# Patient Record
Sex: FEMALE | Race: WHITE | URBAN - METROPOLITAN AREA
[De-identification: names, ages, dates, MRNs, and addresses within clinical notes are randomized per-mention and may not be internally consistent; named-entity substitution may affect disease eponyms.]

---

## 2018-04-17 ENCOUNTER — HOSPITAL ENCOUNTER (OUTPATIENT)
Age: 38
Discharge: HOME | End: 2018-04-19
Payer: MEDICARE

## 2018-04-17 LAB
BILIRUBIN URINE: NEGATIVE
BLOOD, URINE: NEGATIVE
CLARITY: CLEAR
COLOR: YELLOW
GLUCOSE URINE: NEGATIVE MG/DL
KETONES, URINE: NEGATIVE MG/DL
LEUKOCYTE ESTERASE, URINE: NEGATIVE
NITRITE, URINE: NEGATIVE
PH UA: 6.5 (ref 5–9)
PROTEIN UA: NEGATIVE MG/DL
SPECIFIC GRAVITY UA: 1.02 (ref 1–1.03)
UROBILINOGEN, URINE: 0.2 E.U./DL

## 2018-04-17 PROCEDURE — 87088 URINE BACTERIA CULTURE: CPT

## 2018-04-17 PROCEDURE — 81003 URINALYSIS AUTO W/O SCOPE: CPT

## 2018-04-20 LAB — URINE CULTURE, ROUTINE: NORMAL

## 2020-01-16 ENCOUNTER — HOSPITAL ENCOUNTER (OUTPATIENT)
Age: 40
Discharge: HOME | End: 2020-01-18
Payer: MEDICARE

## 2020-01-16 PROCEDURE — 87491 CHLMYD TRACH DNA AMP PROBE: CPT

## 2020-01-16 PROCEDURE — 87591 N.GONORRHOEAE DNA AMP PROB: CPT

## 2020-01-21 LAB
C TRACH DNA GENITAL QL NAA+PROBE: NEGATIVE
N. GONORRHOEAE DNA: NEGATIVE
SOURCE: NORMAL

## 2020-06-25 ENCOUNTER — HOSPITAL ENCOUNTER (OUTPATIENT)
Age: 40
Discharge: HOME | End: 2020-06-27
Payer: MEDICARE

## 2020-06-25 PROCEDURE — G0123 SCREEN CERV/VAG THIN LAYER: HCPCS

## 2022-06-06 ENCOUNTER — HOSPITAL ENCOUNTER (EMERGENCY)
Age: 42
Discharge: HOME | End: 2022-06-06
Payer: MEDICARE

## 2022-06-06 VITALS
WEIGHT: 209 LBS | HEIGHT: 64 IN | BODY MASS INDEX: 35.68 KG/M2 | HEART RATE: 91 BPM | TEMPERATURE: 97.3 F | DIASTOLIC BLOOD PRESSURE: 76 MMHG | RESPIRATION RATE: 16 BRPM | OXYGEN SATURATION: 98 % | SYSTOLIC BLOOD PRESSURE: 127 MMHG

## 2022-06-06 DIAGNOSIS — F12.90 MARIJUANA USE: Primary | ICD-10-CM

## 2022-06-06 LAB
ALBUMIN SERPL-MCNC: 4.2 G/DL (ref 3.5–5.2)
ALP BLD-CCNC: 50 U/L (ref 35–104)
ALT SERPL-CCNC: 18 U/L (ref 0–32)
AMPHETAMINE SCREEN, URINE: NOT DETECTED
ANION GAP SERPL CALCULATED.3IONS-SCNC: 9 MMOL/L (ref 7–16)
AST SERPL-CCNC: 16 U/L (ref 0–31)
BACTERIA: NORMAL /HPF
BARBITURATE SCREEN URINE: NOT DETECTED
BASOPHILS ABSOLUTE: 0.05 E9/L (ref 0–0.2)
BASOPHILS RELATIVE PERCENT: 0.6 % (ref 0–2)
BENZODIAZEPINE SCREEN, URINE: NOT DETECTED
BILIRUB SERPL-MCNC: 0.2 MG/DL (ref 0–1.2)
BILIRUBIN URINE: NEGATIVE
BLOOD, URINE: NEGATIVE
BUN BLDV-MCNC: 10 MG/DL (ref 6–20)
CALCIUM SERPL-MCNC: 9.5 MG/DL (ref 8.6–10.2)
CANNABINOID SCREEN URINE: POSITIVE
CHLORIDE BLD-SCNC: 106 MMOL/L (ref 98–107)
CLARITY: CLEAR
CO2: 24 MMOL/L (ref 22–29)
COCAINE METABOLITE SCREEN URINE: NOT DETECTED
COLOR: YELLOW
CREAT SERPL-MCNC: 0.7 MG/DL (ref 0.5–1)
EOSINOPHILS ABSOLUTE: 0.16 E9/L (ref 0.05–0.5)
EOSINOPHILS RELATIVE PERCENT: 1.8 % (ref 0–6)
EPITHELIAL CELLS, UA: NORMAL /HPF
FENTANYL SCREEN, URINE: NOT DETECTED
GFR AFRICAN AMERICAN: >60
GFR NON-AFRICAN AMERICAN: >60 ML/MIN/1.73
GLUCOSE BLD-MCNC: 94 MG/DL (ref 74–99)
GLUCOSE URINE: NEGATIVE MG/DL
HCG(URINE) PREGNANCY TEST: NEGATIVE
HCT VFR BLD CALC: 35.9 % (ref 34–48)
HEMOGLOBIN: 12.8 G/DL (ref 11.5–15.5)
IMMATURE GRANULOCYTES #: 0.04 E9/L
IMMATURE GRANULOCYTES %: 0.5 % (ref 0–5)
KETONES, URINE: NEGATIVE MG/DL
LEUKOCYTE ESTERASE, URINE: NEGATIVE
LYMPHOCYTES ABSOLUTE: 2.51 E9/L (ref 1.5–4)
LYMPHOCYTES RELATIVE PERCENT: 28.4 % (ref 20–42)
Lab: ABNORMAL
MCH RBC QN AUTO: 29.6 PG (ref 26–35)
MCHC RBC AUTO-ENTMCNC: 35.7 % (ref 32–34.5)
MCV RBC AUTO: 82.9 FL (ref 80–99.9)
METHADONE SCREEN, URINE: NOT DETECTED
MONOCYTES ABSOLUTE: 0.91 E9/L (ref 0.1–0.95)
MONOCYTES RELATIVE PERCENT: 10.3 % (ref 2–12)
NEUTROPHILS ABSOLUTE: 5.16 E9/L (ref 1.8–7.3)
NEUTROPHILS RELATIVE PERCENT: 58.4 % (ref 43–80)
NITRITE, URINE: NEGATIVE
OPIATE SCREEN URINE: NOT DETECTED
OXYCODONE URINE: NOT DETECTED
PDW BLD-RTO: 12.7 FL (ref 11.5–15)
PH UA: 6 (ref 5–9)
PHENCYCLIDINE SCREEN URINE: NOT DETECTED
PLATELET # BLD: 361 E9/L (ref 130–450)
PMV BLD AUTO: 9.3 FL (ref 7–12)
POTASSIUM REFLEX MAGNESIUM: 3.9 MMOL/L (ref 3.5–5)
PROTEIN UA: NEGATIVE MG/DL
RBC # BLD: 4.33 E12/L (ref 3.5–5.5)
RBC UA: NORMAL /HPF (ref 0–2)
SODIUM BLD-SCNC: 139 MMOL/L (ref 132–146)
SPECIFIC GRAVITY UA: 1.01 (ref 1–1.03)
TOTAL PROTEIN: 7.2 G/DL (ref 6.4–8.3)
UROBILINOGEN, URINE: 0.2 E.U./DL
WBC # BLD: 8.8 E9/L (ref 4.5–11.5)
WBC UA: NORMAL /HPF (ref 0–5)

## 2022-06-06 PROCEDURE — 82077 ASSAY SPEC XCP UR&BREATH IA: CPT

## 2022-06-06 PROCEDURE — 85025 COMPLETE CBC W/AUTO DIFF WBC: CPT

## 2022-06-06 PROCEDURE — 87088 URINE BACTERIA CULTURE: CPT

## 2022-06-06 PROCEDURE — 99283 EMERGENCY DEPT VISIT LOW MDM: CPT

## 2022-06-06 PROCEDURE — 80143 DRUG ASSAY ACETAMINOPHEN: CPT

## 2022-06-06 PROCEDURE — 36415 COLL VENOUS BLD VENIPUNCTURE: CPT

## 2022-06-06 PROCEDURE — 80307 DRUG TEST PRSMV CHEM ANLYZR: CPT

## 2022-06-06 PROCEDURE — 81001 URINALYSIS AUTO W/SCOPE: CPT

## 2022-06-06 PROCEDURE — 80053 COMPREHEN METABOLIC PANEL: CPT

## 2022-06-06 PROCEDURE — 81025 URINE PREGNANCY TEST: CPT

## 2022-06-06 PROCEDURE — 80179 DRUG ASSAY SALICYLATE: CPT

## 2022-06-06 RX ORDER — ACETAMINOPHEN 500 MG
1000 TABLET ORAL ONCE
Status: DISCONTINUED | OUTPATIENT
Start: 2022-06-06 | End: 2022-06-06 | Stop reason: HOSPADM

## 2022-06-06 ASSESSMENT — PAIN SCALES - GENERAL: PAINLEVEL_OUTOF10: 0

## 2022-06-06 ASSESSMENT — PAIN - FUNCTIONAL ASSESSMENT: PAIN_FUNCTIONAL_ASSESSMENT: NONE - DENIES PAIN

## 2022-06-06 NOTE — Clinical Note
Obed Westfall was seen and treated in our emergency department on 6/6/2022. She may return to work on 06/08/2022. If you have any questions or concerns, please don't hesitate to call.       THERESE Martinez - CNP

## 2022-06-07 LAB
ACETAMINOPHEN LEVEL: <5 MCG/ML (ref 10–30)
ETHANOL: <10 MG/DL (ref 0–0.08)
SALICYLATE, SERUM: <0.3 MG/DL (ref 0–30)
TRICYCLIC ANTIDEPRESSANTS SCREEN SERUM: NEGATIVE NG/ML
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
## 2022-06-09 LAB — URINE CULTURE, ROUTINE: NORMAL

## 2023-03-12 ENCOUNTER — APPOINTMENT (OUTPATIENT)
Dept: GENERAL RADIOLOGY | Age: 43
End: 2023-03-12
Payer: MEDICARE

## 2023-03-12 ENCOUNTER — HOSPITAL ENCOUNTER (EMERGENCY)
Age: 43
Discharge: HOME | End: 2023-03-12
Attending: EMERGENCY MEDICINE
Payer: MEDICARE

## 2023-03-12 VITALS
RESPIRATION RATE: 17 BRPM | BODY MASS INDEX: 34.15 KG/M2 | HEART RATE: 112 BPM | SYSTOLIC BLOOD PRESSURE: 138 MMHG | WEIGHT: 200 LBS | OXYGEN SATURATION: 95 % | DIASTOLIC BLOOD PRESSURE: 74 MMHG | HEIGHT: 64 IN | TEMPERATURE: 98.3 F

## 2023-03-12 DIAGNOSIS — T40.601A OPIATE OVERDOSE, ACCIDENTAL OR UNINTENTIONAL, INITIAL ENCOUNTER (HCC): Primary | ICD-10-CM

## 2023-03-12 LAB
ACETAMINOPHEN LEVEL: <5 MCG/ML (ref 10–30)
AMPHETAMINE SCREEN, URINE: NOT DETECTED
ANION GAP SERPL CALCULATED.3IONS-SCNC: 9 MMOL/L (ref 7–16)
BARBITURATE SCREEN URINE: NOT DETECTED
BASOPHILS ABSOLUTE: 0.05 E9/L (ref 0–0.2)
BASOPHILS RELATIVE PERCENT: 0.2 % (ref 0–2)
BENZODIAZEPINE SCREEN, URINE: NOT DETECTED
BUN BLDV-MCNC: 14 MG/DL (ref 6–20)
CALCIUM SERPL-MCNC: 8.5 MG/DL (ref 8.6–10.2)
CANNABINOID SCREEN URINE: NOT DETECTED
CHLORIDE BLD-SCNC: 104 MMOL/L (ref 98–107)
CO2: 24 MMOL/L (ref 22–29)
COCAINE METABOLITE SCREEN URINE: NOT DETECTED
CREAT SERPL-MCNC: 0.7 MG/DL (ref 0.5–1)
EOSINOPHILS ABSOLUTE: 0.05 E9/L (ref 0.05–0.5)
EOSINOPHILS RELATIVE PERCENT: 0.2 % (ref 0–6)
ETHANOL: <10 MG/DL (ref 0–0.08)
FENTANYL SCREEN, URINE: POSITIVE
GFR SERPL CREATININE-BSD FRML MDRD: >60 ML/MIN/1.73
GLUCOSE BLD-MCNC: 139 MG/DL (ref 74–99)
GLUCOSE BLD-MCNC: 210 MG/DL
HCG, URINE, POC: NEGATIVE
HCT VFR BLD CALC: 35.2 % (ref 34–48)
HEMOGLOBIN: 12.3 G/DL (ref 11.5–15.5)
IMMATURE GRANULOCYTES #: 0.13 E9/L
IMMATURE GRANULOCYTES %: 0.6 % (ref 0–5)
LYMPHOCYTES ABSOLUTE: 2 E9/L (ref 1.5–4)
LYMPHOCYTES RELATIVE PERCENT: 9.2 % (ref 20–42)
Lab: ABNORMAL
Lab: NORMAL
MCH RBC QN AUTO: 29.5 PG (ref 26–35)
MCHC RBC AUTO-ENTMCNC: 34.9 % (ref 32–34.5)
MCV RBC AUTO: 84.4 FL (ref 80–99.9)
METER GLUCOSE: 210 MG/DL (ref 74–99)
METHADONE SCREEN, URINE: NOT DETECTED
MONOCYTES ABSOLUTE: 1.15 E9/L (ref 0.1–0.95)
MONOCYTES RELATIVE PERCENT: 5.3 % (ref 2–12)
NEGATIVE QC PASS/FAIL: NORMAL
NEUTROPHILS ABSOLUTE: 18.4 E9/L (ref 1.8–7.3)
NEUTROPHILS RELATIVE PERCENT: 84.5 % (ref 43–80)
OPIATE SCREEN URINE: NOT DETECTED
OXYCODONE URINE: NOT DETECTED
PDW BLD-RTO: 12.8 FL (ref 11.5–15)
PHENCYCLIDINE SCREEN URINE: NOT DETECTED
PLATELET # BLD: 395 E9/L (ref 130–450)
PMV BLD AUTO: 9.5 FL (ref 7–12)
POSITIVE QC PASS/FAIL: NORMAL
POTASSIUM SERPL-SCNC: 3.4 MMOL/L (ref 3.5–5)
PRO-BNP: 50 PG/ML (ref 0–125)
RBC # BLD: 4.17 E12/L (ref 3.5–5.5)
SALICYLATE, SERUM: <0.3 MG/DL (ref 0–30)
SODIUM BLD-SCNC: 137 MMOL/L (ref 132–146)
TRICYCLIC ANTIDEPRESSANTS SCREEN SERUM: NEGATIVE NG/ML
TROPONIN, HIGH SENSITIVITY: 29 NG/L (ref 0–9)
WBC # BLD: 21.8 E9/L (ref 4.5–11.5)

## 2023-03-12 PROCEDURE — 82962 GLUCOSE BLOOD TEST: CPT

## 2023-03-12 PROCEDURE — 82077 ASSAY SPEC XCP UR&BREATH IA: CPT

## 2023-03-12 PROCEDURE — 36415 COLL VENOUS BLD VENIPUNCTURE: CPT

## 2023-03-12 PROCEDURE — 83880 ASSAY OF NATRIURETIC PEPTIDE: CPT

## 2023-03-12 PROCEDURE — 2580000003 HC RX 258: Performed by: EMERGENCY MEDICINE

## 2023-03-12 PROCEDURE — 71045 X-RAY EXAM CHEST 1 VIEW: CPT

## 2023-03-12 PROCEDURE — 80179 DRUG ASSAY SALICYLATE: CPT

## 2023-03-12 PROCEDURE — 93005 ELECTROCARDIOGRAM TRACING: CPT | Performed by: EMERGENCY MEDICINE

## 2023-03-12 PROCEDURE — 80143 DRUG ASSAY ACETAMINOPHEN: CPT

## 2023-03-12 PROCEDURE — 85025 COMPLETE CBC W/AUTO DIFF WBC: CPT

## 2023-03-12 PROCEDURE — 6370000000 HC RX 637 (ALT 250 FOR IP): Performed by: EMERGENCY MEDICINE

## 2023-03-12 PROCEDURE — 80048 BASIC METABOLIC PNL TOTAL CA: CPT

## 2023-03-12 PROCEDURE — 99285 EMERGENCY DEPT VISIT HI MDM: CPT

## 2023-03-12 PROCEDURE — 84484 ASSAY OF TROPONIN QUANT: CPT

## 2023-03-12 PROCEDURE — 80307 DRUG TEST PRSMV CHEM ANLYZR: CPT

## 2023-03-12 RX ORDER — POTASSIUM CHLORIDE 20 MEQ/1
40 TABLET, EXTENDED RELEASE ORAL ONCE
Status: COMPLETED | OUTPATIENT
Start: 2023-03-12 | End: 2023-03-12

## 2023-03-12 RX ORDER — ACETAMINOPHEN 500 MG
1000 TABLET ORAL ONCE
Status: COMPLETED | OUTPATIENT
Start: 2023-03-12 | End: 2023-03-12

## 2023-03-12 RX ORDER — 0.9 % SODIUM CHLORIDE 0.9 %
1000 INTRAVENOUS SOLUTION INTRAVENOUS ONCE
Status: COMPLETED | OUTPATIENT
Start: 2023-03-12 | End: 2023-03-12

## 2023-03-12 RX ORDER — ONDANSETRON 4 MG/1
4 TABLET, ORALLY DISINTEGRATING ORAL ONCE
Status: COMPLETED | OUTPATIENT
Start: 2023-03-12 | End: 2023-03-12

## 2023-03-12 RX ADMIN — ACETAMINOPHEN 1000 MG: 500 TABLET ORAL at 04:08

## 2023-03-12 RX ADMIN — POTASSIUM CHLORIDE 40 MEQ: 1500 TABLET, EXTENDED RELEASE ORAL at 08:00

## 2023-03-12 RX ADMIN — ONDANSETRON 4 MG: 4 TABLET, ORALLY DISINTEGRATING ORAL at 04:09

## 2023-03-12 RX ADMIN — SODIUM CHLORIDE 1000 ML: 9 INJECTION, SOLUTION INTRAVENOUS at 05:04

## 2023-03-12 ASSESSMENT — LIFESTYLE VARIABLES
HOW MANY STANDARD DRINKS CONTAINING ALCOHOL DO YOU HAVE ON A TYPICAL DAY: 1 OR 2
HOW OFTEN DO YOU HAVE A DRINK CONTAINING ALCOHOL: 2-4 TIMES A MONTH

## 2023-03-12 NOTE — DISCHARGE INSTRUCTIONS
[facility]  2015  For Residents of [facility], [facility] and [facility]  Call [address] [phone]    * Alcoholics Anonymous [phone]                *Narcotics Anonymous [phone]    [address] [facility] [place] [phone] An [facility] 27, [place], [address]  [email or web address]  *Residents of:  [facility] Only for [employer]. ALL other [employer] on [facility] Accepted. *Payments Accepted: Medicaid or Self-pay ONLY for [employer]. Private Insurance accepted for Outpatient Programs   *Services Offered: Outpatient Behavioral Health & Chemical Dependency. [facility] [phone]  [facility], [address] [place] [address] 210   *Payments Accepted: Medicaid, self-pay  [email or web address]      [address] [phone]  [place] [address]  www.[facility] org/   *Residents of:  [facility] Only   *Payments Accepted: Medicaid or [address] Offered: Outpatient Behavioral Health & Chemical Dependency. [facility] [phone]  or [phone], Maritza Ferrera. [address]  www. [email or web address]/   *Residents of:  [facility]   *Payments Accepted: Self-Pay and [employer]   *Services Offered: Detox & Outpatient Substance Abuse Programs. [address] [place] [address] [phone]) [phone]. [phone]  [address], [place], [address] [place]  www.[email or web address]/  (MUST CALL DAILY FOR BED AVAILABILITY)   *Residents of:  [address] Residents   *Payments Accepted: Medicaid or Self-Pay   *Services Offered: Detox & Outpatient Substance Abuse Programs. [facility] [phone] 61 1025 [address]  www.[employer]. [employer]/  *Residents of:  [address] Residents   *Payments Accepted:  Self-Pay, Private Insurance, Some Medicaid for age <20 Years. *Services Offered: [facility] [place] [facility] [place] [phone] or [phone]  www. [facility].org/     *Residents of:  [facility]   *Payments Accepted: [employer], [place] or Self-Pay. *Services Offered: Following Detox, Outpatient Substance Abuse and [place]  [address] [facility],[address]    [facility], [address] [facility]   [place], [address]    [address]   [address]    [address] [place], [address]   [place], [address]      [address] [facility] [address] [phone] 1200 [facility] [address], [place], [address] [place] [address]  www. "[employer]"/  *Residents of:  All [facility]   *Payments Accepted: [address] [facility]. *Services Offered: Intensive Outpatient Substance Abuse and [facility] [phone]  [facility], [address]  www. [facility]. org/  *Residents of:  [facility]. *Payments Accepted: Private Insurance, [place] and Self-Pay/Sl[employer] Scale. *Services Offered: Substance Abuse Outpatient Programs. [address] [facility],[address] [phone]  6172 [facility], [address]  www.[facility]. org/  *Residents of:  [facility]. *Payments Accepted: Private Insurance, [place] and Self-Pay. *Services Offered: Substance Abuse Outpatient Programs. [facility] [phone]  [address] [place], [place] [address] [facility], [address]  www.[email or web address]  *Residents of:  All [facility]. *Payments Accepted: Private Insurance, [place] and Self-pay. *Services Offered: Substance Abuse Outpatient Programs.       [employer] [phone]  www. [email or web address]/  [facility] Office:    Adult Office:    Children's Office:  [phone] [address]. [place], [address]    Ziyad Johnson, [address]               Ziyad Elizabeth, [address]  *Residents of:  [facility] ONLY for Self-Pay. All Counties accepted with [employer], Medicare & Medicaid   *Payments Accepted: Medicare, Medicaid, Private Insurance and Self-Pay  *Services Offered: [address]. for Counseling. [facility] [phone] 4775 [facility], [address] [place] [address]  www. [facility]. org/  *Payments Accepted: Sliding Scale for Fees. *Services Offered: Call for Available Services. [employer] [phone] [facility] 64, [place], [address]  www. [email or web address]/  (MUST CALL DAILY FOR BED AVAILABILITY)  *Residents of:  [facility]. *Payments Accepted: Medicaid and Self-Pay. *Services Offered: Detox & Substance Abuse Outpatient Programs.

## 2023-03-12 NOTE — LETTER
5 [facility] Emergency Department  [address] [place] [address]  Phone: [phone]               March 12, 2023    Patient: Dee Villatoro   YOB: 1980   Date of Visit: 3/12/2023       To Whom It May Concern:    Yanique Leo was seen and treated in our emergency department on 3/12/2023. She may return to work on 03/14/2023.       Sincerely,               Signature:__________________________________

## 2023-03-12 NOTE — ED PROVIDER NOTES
[facility] ENCOUNTER        Pt Name: Wesley Brown  MRN: [id number]  [place] 1980  Date of evaluation: 3/12/2023  Provider: Blanco Cummings DO  PCP: Mark Reinoso MD  Note Started: 4:01 AM EDT 3/12/23    CHIEF COMPLAINT       Chief Complaint   Patient presents with    Drug Overdose     Pt states they did a line of jackeline, per ems PD was called and gave 0.2mg of narcan intranasally and ems gave 0.2mg more intranasally. HISTORY OF PRESENT ILLNESS: 1 or more Elements   History From: Patient    Limitations to history : None    Wesley Brown is a 38 y.o. female who presents to the emergency department for opiate overdose. The patient states that she was out having fun with her friends when she snorted a line of oxycodone. She states the next thing she knew she woke up with EMS around her. Patient was unresponsive. Patient received Narcan with resolution of her unresponsiveness. Patient does have some nausea and vomiting. Also complains of a headache. No chest pain or shortness of breath. No abdominal pain. No numbness tingling. No focal deficits. Nursing Notes were all reviewed and agreed with or any disagreements were addressed in the HPI. REVIEW OF EXTERNAL NOTE :       EMS report taken. Patient found unresponsive. Patient received 4 mg of Narcan total.  Patient did have some nausea vomiting after Narcan. REVIEW OF SYSTEMS :           Positives and Pertinent negatives as per HPI.      SURGICAL HISTORY     Past Surgical History:   Procedure Laterality Date    ABDOMINOPLASTY      CERVIX REMOVAL      d/t cancer       CURRENTMEDICATIONS       Discharge Medication List as of 3/12/2023  8:11 AM        CONTINUE these medications which have NOT CHANGED    Details   lovastatin (MEVACOR) 40 MG tablet TK 1 T PO QHS, R-3      lamoTRIgine (LAMICTAL) 100 MG tablet TK 1 T PO QD, R-1      Omega-3-acid Ethyl Esters & D3 1 & 1000 GM & UNIT KIT Take 1 g by mouth 2 times daily      CPAP Machine MISC Disp-1 each, R-0, PrintPlease provide patient with a CPAP at 6 cm water pressure with a ResMed AirFit N10 mask size small with heated humidification and C-Flex of 3. Also provide new masks, tubing, filters, head gear, and water chambers as needed. Diagn osis-AYUSH Faxed to [facility] Length of need 99 months      busPIRone (BUSPAR) 10 MG tablet Take 2 tablets by mouth 3 times daily, Disp-42 tablet, R-0      buPROPion (WELLBUTRIN SR) 200 MG extended release tablet Take 1 tablet by mouth every morning, Disp-14 tablet, R-0      prazosin (MINIPRESS) 2 MG capsule Take 1 capsule by mouth nightly, Disp-14 capsule, R-0      melatonin 3 MG TABS tablet Take 1 tablet by mouth nightly, Disp-14 tablet, R-0             ALLERGIES     Patient has no known allergies.     FAMILYHISTORY       Family History   Problem Relation Age of Onset    Cancer Mother         SOCIAL HISTORY       Social History     Tobacco Use    Smoking status: Never    Smokeless tobacco: Never   Substance Use Topics    Alcohol use: No     Alcohol/week: 0.0 standard drinks     Comment: social    Drug use: No       SCREENINGS        [place] Coma Scale  Eye Opening: Spontaneous  Best Verbal Response: Oriented  Best Motor Response: Obeys commands  [place]gy Coma Scale Score: 15                CIWA Assessment  BP: 138/74  Heart Rate: (!) 112           PHYSICAL EXAM  1 or more Elements     ED Triage Vitals   BP Temp Temp Source Heart Rate Resp SpO2 Height Weight   03/12/23 0356 03/12/23 0359 03/12/23 0359 03/12/23 0356 -- 03/12/23 0356 -- 03/12/23 0356   (!) 140/78 98.3 °F (36.8 °C) Oral (!) 108  95 %  200 lb (90.7 kg)                Constitutional/General: Alert and oriented x3, vomit noted on patient's chart  Head: Normocephalic and atraumatic  Eyes: PERRL, EOMI, conjunctiva normal, sclera non icteric  ENT:  Oropharynx clear, handling secretions, no trismus, no asymmetry of the posterior oropharynx or uvular edema  Neck: Supple, full ROM, no stridor, no meningeal signs  Respiratory: Lungs clear to auscultation bilaterally, no wheezes, rales, or rhonchi. Not in respiratory distress  Cardiovascular: Tachycardic regular rhythm. No murmurs, no gallops, no rubs. 2+ distal pulses. Equal extremity pulses. Chest: No chest wall tenderness  GI:  Abdomen Soft, Non tender, Non distended. No rebound, guarding, or rigidity. No pulsatile masses. Musculoskeletal: Moves all extremities x 4. Warm and well perfused, no clubbing, no cyanosis, no edema. Capillary refill <3 seconds  Integument: skin warm and dry. No rashes.    Neurologic: GCS 15, no focal deficits, symmetric strength 5/5 in the upper and lower extremities bilaterally  Psychiatric: Normal Affect            DIAGNOSTIC RESULTS   LABS:    Labs Reviewed   CBC WITH AUTO DIFFERENTIAL - Abnormal; Notable for the following components:       Result Value    WBC 21.8 (*)     MCHC 34.9 (*)     Neutrophils % 84.5 (*)     Lymphocytes % 9.2 (*)     Neutrophils Absolute 18.40 (*)     Monocytes Absolute 1.15 (*)     All other components within normal limits   BASIC METABOLIC PANEL - Abnormal; Notable for the following components:    Potassium 3.4 (*)     Glucose 139 (*)     Calcium 8.5 (*)     All other components within normal limits   TROPONIN - Abnormal; Notable for the following components:    Troponin, High Sensitivity 29 (*)     All other components within normal limits   SERUM DRUG SCREEN - Abnormal; Notable for the following components:    Acetaminophen Level <5.0 (*)     All other components within normal limits   URINE DRUG SCREEN - Abnormal; Notable for the following components:    FENTANYL SCREEN, URINE POSITIVE (*)     All other components within normal limits   POCT GLUCOSE - Abnormal; Notable for the following components:    Meter Glucose 210 (*)     All other components within normal limits   POCT GLUCOSE - Normal   BRAIN NATRIURETIC PEPTIDE   POC PREGNANCY UR-QUAL       As interpreted by me, the above displayed labs are abnormal. All other labs obtained during this visit were within normal range or not returned as of this dictation. EKG Interpretation  Interpreted by emergency department physician, Radhika Fowler DO    EKG shows sinus tachycardia 104 bpm.  Normal axis. Normal QRS. No STEMI. RADIOLOGY:   Non-plain film images such as CT, Ultrasound and MRI are read by the radiologist. Plain radiographic images are visualized and preliminarily interpreted by the ED Provider with the below findings:    X-ray shows no acute process    Interpretation per the Radiologist below, if available at the time of this note:    XR CHEST PORTABLE   Final Result   No acute disease. RECOMMENDATION:   Careful clinical correlation and follow up recommended. No results found. No results found. PROCEDURES   Unless otherwise noted below, none          CRITICAL CARE TIME (.cct)       PAST MEDICAL HISTORY/Chronic Conditions Affecting Care      has a past medical history of Abnormal Pap smear, Anxiety, Bipolar disorder, unspecified ([facility] [address] (1/12/2015), Cancer ([facility] [address] Constipation, Depression, Obesity complicating pregnancy, childbirth, or puerperium, antepartum (1/13/2015), Postpartum depression, and PTSD (post-traumatic stress disorder).      EMERGENCY DEPARTMENT COURSE    Vitals:    Vitals:    03/12/23 0700 03/12/23 0703 03/12/23 0803 03/12/23 0826   BP:  122/75 129/64 138/74   Pulse: (!) 114  (!) 112 (!) 112   Resp: 16  19 17   Temp:    98.3 °F (36.8 °C)   TempSrc:       SpO2: 97%  96% 95%   Weight:       Height:           Patient was given the following medications:  Medications   ondansetron (ZOFRAN-ODT) disintegrating tablet 4 mg (4 mg Oral Given 3/12/23 0409)   acetaminophen (TYLENOL) tablet 1,000 mg (1,000 mg Oral Given 3/12/23 0408)   0.9 % sodium chloride bolus (0 mLs IntraVENous Stopped 3/12/23 0707)   potassium chloride (KLOR-CON M) extended release tablet 40 mEq (40 mEq Oral Given 3/12/23 0800)           Is this patient to be included in the SEP-1 Core Measure due to severe sepsis or septic shock? No Exclusion criteria - the patient is NOT to be included for SEP-1 Core Measure due to: Infection is not suspected        Medical Decision Making/Differential Diagnosis:    CC/HPI Summary, Social Determinants of health, Records Reviewed, DDx, testing done/not done, ED Course, Reassessment, disposition considerations/shared decision making with patient, consults, disposition:      ED Course as of 03/12/23 2009   Sun Mar 12, 2023   0611 6:11 AM EDT  I received this patient at sign out from Dr. Dahiana Oscar. I have discussed the patient's initial exam, treatment and plan of care with the out going physician. I have introduced my self to the patient / family and have answered their questions to this point. I have examined the patient myself and reviewed ordered tests / medications and  reviewed any available results to this point. If a resident is involved in the Emergency Department care, I have discussed my findings and plan with them as well. Awaiting labs. [TG]      ED Course User Index  [TG] Daylin Fulton, DO        Medical Decision Making  Amount and/or Complexity of Data Reviewed  Labs: ordered. Radiology: ordered. ECG/medicine tests: ordered. Risk  OTC drugs. Prescription drug management. This is a 82-year-old female presented to the ED after an opiate overdose. Upon arrival to the ED patient's vital signs are within normal limits except for blood pressure 140/78 and a heart rate of 108. On examination patient appears nontoxic. Mildly tachycardic. Differential diagnoses includes but is not limited to opiate overdose, polysubstance abuse, hypoglycemia. Patient will undergo POC glucose. Patient given oral Tylenol and oral Zofran for symptom control.   Patient undergo observation for 2 hours due to opiate overdose and Narcan use.  While patient was undergoing observation patient did have an episode of hypoxia down to 88%. Patient placed on nasal cannula. Due to the patient's episode of hypoxia Labs and imaging and EKG ordered. EKG showed sinus tachycardia no ischemic changes. Chest x-ray with was in normal limits with no acute process. Patient's CBC did show leukocytosis 21.8. At this time patient was signed out to Dr. Janet Gutierrez pending BMP troponin serum drug screen urine drug screen reevaluation and disposition. Please see his note for further details. CONSULTS: (Who and What was discussed)  None        I am the Primary Clinician of Record. FINAL IMPRESSION      1.  Opiate overdose, accidental or unintentional, initial encounter [facility])          DISPOSITION/PLAN     DISPOSITION Decision To Discharge 03/12/2023 08:10:23 AM      PATIENT REFERRED TO:  Nikolas Madden MD  [address] [place] [address] [phone]    Call in 1 day      Nikolas Madden MD  [address] [facility] [phone]    Call in 1 day      DISCHARGE MEDICATIONS:  Discharge Medication List as of 3/12/2023  8:11 AM          DISCONTINUED MEDICATIONS:  Discharge Medication List as of 3/12/2023  8:11 AM                 (Please note that portions of this note were completed with a voice recognition program.  Efforts were made to edit the dictations but occasionally words are mis-transcribed.)    Kym Roman DO (electronically signed)            Kym Roman DO  03/12/23 2010

## 2023-03-12 NOTE — ED PROVIDER NOTES
ED Course as of 03/12/23 0810   Sun Mar 12, 2023   0611 6:11 AM EDT  I received this patient at sign out from Dr. Tee Lacy. I have discussed the patient's initial exam, treatment and plan of care with the out going physician. I have introduced my self to the patient / family and have answered their questions to this point. I have examined the patient myself and reviewed ordered tests / medications and  reviewed any available results to this point. If a resident is involved in the Emergency Department care, I have discussed my findings and plan with them as well. Awaiting labs. [TG]      ED Course User Index  [TG] Cumberlandzo Beasley, DO       --------------------------------------------- PAST HISTORY ---------------------------------------------  Past Medical History:  has a past medical history of Abnormal Pap smear, Anxiety, Bipolar disorder, unspecified ([facility] [address] Cancer ([facility] [address] Constipation, Depression, Obesity complicating pregnancy, childbirth, or puerperium, antepartum, Postpartum depression, and PTSD (post-traumatic stress disorder). Past Surgical History:  has a past surgical history that includes Abdominoplasty and Cervix removal.    Social History:  reports that she has never smoked. She has never used smokeless tobacco. She reports that she does not drink alcohol and does not use drugs. Family History: family history includes Cancer in her mother. The patients home medications have been reviewed. Allergies: Patient has no known allergies.     -------------------------------------------------- RESULTS -------------------------------------------------  Labs:  Results for orders placed or performed during the hospital encounter of 03/12/23   CBC with Auto Differential   Result Value Ref Range    WBC 21.8 (H) 4.5 - 11.5 E9/L    RBC 4.17 3.50 - 5.50 E12/L    Hemoglobin 12.3 11.5 - 15.5 g/dL    Hematocrit 35.2 34.0 - 48.0 %    MCV 84.4 80.0 - 99.9 fL    MCH 29.5 26.0 - 35.0 pg    MCHC 34.9 (H) 32.0 - 34.5 %    RDW 12.8 11.5 - 15.0 fL    Platelets 042 099 - 192 E9/L    MPV 9.5 7.0 - 12.0 fL    Neutrophils % 84.5 (H) 43.0 - 80.0 %    Immature Granulocytes % 0.6 0.0 - 5.0 %    Lymphocytes % 9.2 (L) 20.0 - 42.0 %    Monocytes % 5.3 2.0 - 12.0 %    Eosinophils % 0.2 0.0 - 6.0 %    Basophils % 0.2 0.0 - 2.0 %    Neutrophils Absolute 18.40 (H) 1.80 - 7.30 E9/L    Immature Granulocytes # 0.13 E9/L    Lymphocytes Absolute 2.00 1.50 - 4.00 E9/L    Monocytes Absolute 1.15 (H) 0.10 - 0.95 E9/L    Eosinophils Absolute 0.05 0.05 - 0.50 E9/L    Basophils Absolute 0.05 0.00 - 0.20 E9/L   BMP   Result Value Ref Range    Sodium 137 132 - 146 mmol/L    Potassium 3.4 (L) 3.5 - 5.0 mmol/L    Chloride 104 98 - 107 mmol/L    CO2 24 22 - 29 mmol/L    Anion Gap 9 7 - 16 mmol/L    Glucose 139 (H) 74 - 99 mg/dL    BUN 14 6 - 20 mg/dL    Creatinine 0.7 0.5 - 1.0 mg/dL    Est, Glom Filt Rate >60 >=60 mL/min/1.73    Calcium 8.5 (L) 8.6 - 10.2 mg/dL   Troponin   Result Value Ref Range    Troponin, High Sensitivity 29 (H) 0 - 9 ng/L   Brain Natriuretic Peptide   Result Value Ref Range    Pro-BNP 50 0 - 125 pg/mL   Serum Drug Screen   Result Value Ref Range    Ethanol Lvl <10 mg/dL    Acetaminophen Level <5.0 (L) 10.0 - 81.5 mcg/mL    Salicylate, Serum <4.1 0.0 - 30.0 mg/dL   URINE DRUG SCREEN   Result Value Ref Range    Amphetamine Screen, Urine NOT DETECTED Negative <1000 ng/mL    Barbiturate Screen, Ur NOT DETECTED Negative < 200 ng/mL    Benzodiazepine Screen, Urine NOT DETECTED Negative < 200 ng/mL    Cannabinoid Scrn, Ur NOT DETECTED Negative < 50ng/mL    Cocaine Metabolite Screen, Urine NOT DETECTED Negative < 300 ng/mL    Opiate Scrn, Ur NOT DETECTED Negative < 300ng/mL    PCP Screen, Urine NOT DETECTED Negative < 25 ng/mL    Methadone Screen, Urine NOT DETECTED Negative <300 ng/mL    Oxycodone Urine NOT DETECTED Negative <100 ng/mL    FENTANYL SCREEN, URINE POSITIVE (A) Negative <1 ng/mL    Drug Screen Comment: see below    POCT Glucose   Result Value Ref Range    Glucose 210 mg/dL   POCT Glucose   Result Value Ref Range    Meter Glucose 210 (H) 74 - 99 mg/dL   POC Pregnancy Urine Qual   Result Value Ref Range    HCG, Urine, POC Negative Negative    Lot Number [id number]     Positive QC Pass/Fail Pass     Negative QC Pass/Fail Pass        Radiology:  XR CHEST PORTABLE   Final Result   No acute disease. RECOMMENDATION:   Careful clinical correlation and follow up recommended. ------------------------- NURSING NOTES AND VITALS REVIEWED ---------------------------  Date / Time Roomed:  3/12/2023  3:52 AM  ED Bed Assignment:  23/23    The nursing notes within the ED encounter and vital signs as below have been reviewed. /64   Pulse (!) 112   Temp 98.3 °F (36.8 °C) (Oral)   Resp 19   Ht 5' 4\" (1.626 m)   Wt 200 lb (90.7 kg)   SpO2 96%   BMI 34.33 kg/m²   Oxygen Saturation Interpretation: Normal      ------------------------------------------ PROGRESS NOTES ------------------------------------------  8:10 AM EDT  I have spoken with the patient and discussed todays results, in addition to providing specific details for the plan of care and counseling regarding the diagnosis and prognosis. Their questions are answered at this time and they are agreeable with the plan. I discussed at length with them reasons for immediate return here for re evaluation. They will followup with their primary care physician by calling their office on Monday.      --------------------------------- ADDITIONAL PROVIDER NOTES ---------------------------------  At this time the patient is without objective evidence of an acute process requiring hospitalization or inpatient management. They have remained hemodynamically stable throughout their entire ED visit and are stable for discharge with outpatient follow-up.      The plan has been discussed in detail and they are aware of the specific conditions for emergent return, as well as the importance of follow-up. New Prescriptions    No medications on file       Diagnosis:  1. Opiate overdose, accidental or unintentional, initial encounter [facility])        Disposition:  Patient's disposition: Discharge to home  Patient's condition is stable.         Alice Hall [place]  03/12/23 8227

## 2023-03-12 NOTE — ED NOTES
Reviewed discharge instructions with patient, discussed medications and addressed all patient and family questions/concerns. Pt verbalizes understanding.        Thais Woo RN  03/12/23 0077

## 2023-03-14 LAB
EKG ATRIAL RATE: 112 BPM
EKG P AXIS: 51 DEGREES
EKG P-R INTERVAL: 152 MS
EKG Q-T INTERVAL: 350 MS
EKG QRS DURATION: 80 MS
EKG QTC CALCULATION (BAZETT): 477 MS
EKG R AXIS: 61 DEGREES
EKG T AXIS: 31 DEGREES
EKG VENTRICULAR RATE: 112 BPM

## 2023-03-14 PROCEDURE — 93010 ELECTROCARDIOGRAM REPORT: CPT | Performed by: INTERNAL MEDICINE
